# Patient Record
Sex: MALE | Race: WHITE | NOT HISPANIC OR LATINO | URBAN - METROPOLITAN AREA
[De-identification: names, ages, dates, MRNs, and addresses within clinical notes are randomized per-mention and may not be internally consistent; named-entity substitution may affect disease eponyms.]

---

## 2017-07-11 ENCOUNTER — OFFICE (OUTPATIENT)
Dept: URBAN - METROPOLITAN AREA CLINIC 33 | Facility: CLINIC | Age: 57
End: 2017-07-11

## 2017-07-11 PROCEDURE — 00031: CPT

## 2017-07-25 ENCOUNTER — OFFICE (OUTPATIENT)
Dept: URBAN - METROPOLITAN AREA CLINIC 32 | Facility: CLINIC | Age: 57
End: 2017-07-25
Payer: COMMERCIAL

## 2017-07-25 VITALS
HEART RATE: 56 BPM | TEMPERATURE: 97.9 F | SYSTOLIC BLOOD PRESSURE: 132 MMHG | HEIGHT: 67 IN | HEART RATE: 60 BPM | RESPIRATION RATE: 14 BRPM | RESPIRATION RATE: 16 BRPM | OXYGEN SATURATION: 96 % | DIASTOLIC BLOOD PRESSURE: 89 MMHG | DIASTOLIC BLOOD PRESSURE: 85 MMHG | WEIGHT: 220 LBS | SYSTOLIC BLOOD PRESSURE: 141 MMHG | TEMPERATURE: 97.3 F | SYSTOLIC BLOOD PRESSURE: 112 MMHG | DIASTOLIC BLOOD PRESSURE: 76 MMHG

## 2017-07-25 DIAGNOSIS — Z12.11 ENCOUNTER FOR SCREENING FOR MALIGNANT NEOPLASM OF COLON: ICD-10-CM

## 2017-07-25 PROBLEM — K57.30 DVRTCLOS OF LG INT W/O PERFORATION OR ABSCESS W/O BLEEDING: Status: ACTIVE | Noted: 2017-07-25

## 2017-07-25 PROCEDURE — 00810: CPT | Mod: QS,AA

## 2017-07-25 PROCEDURE — 00810: CPT | Mod: AA,QS

## 2018-10-24 ENCOUNTER — OFFICE (OUTPATIENT)
Dept: URBAN - METROPOLITAN AREA CLINIC 33 | Facility: CLINIC | Age: 58
End: 2018-10-24

## 2018-10-24 VITALS
HEIGHT: 67 IN | WEIGHT: 226 LBS | HEART RATE: 67 BPM | TEMPERATURE: 97.5 F | DIASTOLIC BLOOD PRESSURE: 82 MMHG | SYSTOLIC BLOOD PRESSURE: 128 MMHG

## 2018-10-24 DIAGNOSIS — K64.0 FIRST DEGREE HEMORRHOIDS: ICD-10-CM

## 2018-10-24 DIAGNOSIS — L29.0 PRURITUS ANI: ICD-10-CM

## 2018-10-24 PROCEDURE — 99214 OFFICE O/P EST MOD 30 MIN: CPT

## 2018-10-24 NOTE — SERVICEHPINOTES
CALLIE BARRIOS   is a   58   male who complains of rectal pruritus. Colonoscopy 7/2017 revealed grade 1 internal hemorrhoids, diverticulosis, no polyps. He reports rectal pruritus for a few years now. Was using anusol suppositories intermittently for a few years which helped--he states he was told he should not continue this long term and stopped a year or so ago. He currently has been using desitin with some relief as well. BMs 3-4x/day, BSS type 3-4. He denies straining or having to sit on the toilet for time. No hard stool.  No rectal pain. Occasional brbpr when wiping--this occurs very rarely. No family hx of colon cancer or IBD. No weight loss, fever, or chills.